# Patient Record
Sex: MALE | Race: BLACK OR AFRICAN AMERICAN | Employment: FULL TIME | URBAN - METROPOLITAN AREA
[De-identification: names, ages, dates, MRNs, and addresses within clinical notes are randomized per-mention and may not be internally consistent; named-entity substitution may affect disease eponyms.]

---

## 2017-05-05 ENCOUNTER — OFFICE VISIT (OUTPATIENT)
Dept: INTERNAL MEDICINE CLINIC | Age: 72
End: 2017-05-05

## 2017-05-05 VITALS
DIASTOLIC BLOOD PRESSURE: 73 MMHG | WEIGHT: 165.1 LBS | HEART RATE: 62 BPM | BODY MASS INDEX: 23.11 KG/M2 | SYSTOLIC BLOOD PRESSURE: 131 MMHG | HEIGHT: 71 IN | TEMPERATURE: 98 F | RESPIRATION RATE: 16 BRPM | OXYGEN SATURATION: 100 %

## 2017-05-05 DIAGNOSIS — E78.5 HYPERLIPIDEMIA, UNSPECIFIED HYPERLIPIDEMIA TYPE: ICD-10-CM

## 2017-05-05 DIAGNOSIS — I10 ESSENTIAL HYPERTENSION: ICD-10-CM

## 2017-05-05 DIAGNOSIS — N18.30 CKD (CHRONIC KIDNEY DISEASE) STAGE 3, GFR 30-59 ML/MIN (HCC): ICD-10-CM

## 2017-05-05 DIAGNOSIS — E11.69 TYPE 2 DIABETES MELLITUS WITH OTHER SPECIFIED COMPLICATION (HCC): Primary | ICD-10-CM

## 2017-05-05 LAB
GLUCOSE POC: 114 MG/DL
HBA1C MFR BLD HPLC: 6.2 %
HGB BLD-MCNC: 6.2 G/DL

## 2017-05-05 RX ORDER — AMLODIPINE BESYLATE 5 MG/1
5 TABLET ORAL DAILY
Qty: 90 TAB | Refills: 3 | Status: SHIPPED | OUTPATIENT
Start: 2017-05-05 | End: 2017-09-08 | Stop reason: SDUPTHER

## 2017-05-05 RX ORDER — LISINOPRIL 20 MG/1
20 TABLET ORAL DAILY
Qty: 90 TAB | Refills: 3 | Status: SHIPPED | OUTPATIENT
Start: 2017-05-05

## 2017-05-05 RX ORDER — INSULIN ASPART 100 [IU]/ML
INJECTION, SOLUTION INTRAVENOUS; SUBCUTANEOUS
Qty: 3 PEN | Refills: 3 | Status: SHIPPED | OUTPATIENT
Start: 2017-05-05 | End: 2017-11-13 | Stop reason: SDUPTHER

## 2017-05-05 NOTE — PROGRESS NOTES
Octavio Jung is a 66 y.o. male and presents with Diabetes and Follow-up  . Compliant with meds and DM diet. No CP, SOB, or edema. Checks BS every day (100-135). No paresthesias. Last DWAYNE was 2015. Normal exam.   Nocturia x 1. Was seeing renal but his appt last month had to be rescheduled. He saw Dr. Lilibeth Pereira (Vasc surg) who examined his carotids who gave him a clean bill of health with f/u in 1 yr. Review of Systems  Constitutional: negative for fevers, chills, anorexia and weight loss  Eyes:   negative for visual disturbance and irritation  ENT:   negative for tinnitus,sore throat,nasal congestion,ear pains. hoarseness  Respiratory:  negative for cough, hemoptysis, dyspnea,wheezing  CV:   negative for chest pain, palpitations, lower extremity edema  GI:   negative for nausea, vomiting, diarrhea, abdominal pain,melena  Endo:               negative for polyuria,polydipsia,polyphagia,heat intolerance  Genitourinary: negative for frequency, dysuria and hematuria  Integument:  negative for rash and pruritus  Hematologic:  negative for easy bruising and gum/nose bleeding  Musculoskel: negative for myalgias, arthralgias, back pain, muscle weakness, joint pain  Neurological:  negative for headaches, dizziness, vertigo, memory problems and gait   Behavl/Psych: negative for feelings of anxiety, depression, mood changes    Past Medical History:   Diagnosis Date    Diabetes ([facility]     Hyperlipidemia     Hypertension      History reviewed. No pertinent surgical history.   Social History     Social History    Marital status: UNKNOWN     Spouse name: N/A    Number of children: N/A    Years of education: N/A     Social History Main Topics    Smoking status: Never Smoker    Smokeless tobacco: None    Alcohol use No    Drug use: No    Sexual activity: Not Asked     Other Topics Concern    None     Social History Narrative     Current Outpatient Prescriptions   Medication Sig Dispense Refill    amLODIPine (NORVASC) 5 mg tablet Take 1 Tab by mouth daily. 90 Tab 3    lisinopril (PRINIVIL, ZESTRIL) 20 mg tablet Take 1 Tab by mouth daily. 90 Tab 3    insulin detemir (LEVEMIR FLEXTOUCH) 100 unit/mL (3 mL) inpn 30 units once daily 6 Pen 3    insulin aspart (NOVOLOG) 100 unit/mL inpn 15 units bid with meals 3 Pen 3    atorvastatin (LIPITOR) 40 mg tablet TAKE ONE-HALF TABLET BY MOUTH ONCE DAILY 45 Tab 5    Insulin Needles, Disposable, (BD INSULIN PEN NEEDLE UF SHORT) 31 gauge x 5/16\" ndle Use with insulin pen. E11.65 1 Package 11    glucose blood VI test strips (BLOOD GLUCOSE TEST) strip Checks sugars once a day Dx\" E11.65 100 Strip 11     Allergies   Allergen Reactions    Cialis [Tadalafil] Other (comments)     headache    Milk Unable to Obtain    Shellfish Derived Swelling    Viagra [Sildenafil] Other (comments)     Headache       Objective:  Visit Vitals    /73 (BP 1 Location: Left arm, BP Patient Position: Sitting)    Pulse 62    Temp 98 °F (36.7 °C) (Oral)    Resp 16    Ht 5' 10.8\" (1.798 m)    Wt 165 lb 1.6 oz (74.9 kg)    SpO2 100%    BMI 23.16 kg/m2     Physical Exam:   General appearance - alert, well appearing, and in no distress  Mental status - alert, oriented to person, place, and time  Chest - clear to auscultation, no wheezes, rales or rhonchi, symmetric air entry   Heart - normal rate, regular rhythm, normal S1, S2, no murmurs, rubs, clicks or gallops   Abdomen - soft, nontender, nondistended, no masses or organomegaly  Lymph- no adenopathy palpable  Ext-peripheral pulses normal, no pedal edema, no clubbing or cyanosis  Skin-Warm and dry.  no hyperpigmentation, vitiligo, or suspicious lesions  Neuro -alert, oriented, normal speech, no focal findings or movement disorder noted      Results for orders placed or performed in visit on 05/05/17   AMB POC GLUCOSE BLOOD, BY GLUCOSE MONITORING DEVICE   Result Value Ref Range    Glucose  mg/dL   AMB POC HEMOGLOBIN (HGB)   Result Value Ref Range    Hemoglobin (POC) 6.2    AMB POC HEMOGLOBIN A1C   Result Value Ref Range    Hemoglobin A1c (POC) 6.2 %       Assessment/Plan:    ICD-10-CM ICD-9-CM    1. Type 2 diabetes mellitus with other specified complication (HCC) F44.86 250.80 AMB POC GLUCOSE BLOOD, BY GLUCOSE MONITORING DEVICE      AMB POC HEMOGLOBIN (HGB)      AMB POC HEMOGLOBIN A1C      REFERRAL TO OPHTHALMOLOGY      METABOLIC PANEL, COMPREHENSIVE   2. Essential hypertension I09 787.3 METABOLIC PANEL, COMPREHENSIVE   3. Hyperlipidemia, unspecified hyperlipidemia type P83.3 857.1 METABOLIC PANEL, COMPREHENSIVE      LIPID PANEL     Orders Placed This Encounter    METABOLIC PANEL, COMPREHENSIVE    LIPID PANEL    REFERRAL TO OPHTHALMOLOGY     Referral Priority:   Routine     Referral Type:   Consultation     Referral Reason:   Specialty Services Required     Referral Location:   Tejinder Roca M.D. Referred to Provider:   Tejinder Roca MD    AMB POC GLUCOSE BLOOD, BY GLUCOSE MONITORING DEVICE    AMB POC HEMOGLOBIN (HGB)    AMB POC HEMOGLOBIN A1C    amLODIPine (NORVASC) 5 mg tablet     Sig: Take 1 Tab by mouth daily. Dispense:  90 Tab     Refill:  3    lisinopril (PRINIVIL, ZESTRIL) 20 mg tablet     Sig: Take 1 Tab by mouth daily. Dispense:  90 Tab     Refill:  3    insulin detemir (LEVEMIR FLEXTOUCH) 100 unit/mL (3 mL) inpn     Si units once daily     Dispense:  6 Pen     Refill:  3    insulin aspart (NOVOLOG) 100 unit/mL inpn     Sig: 15 units bid with meals     Dispense:  3 Pen     Refill:  3     DM & HTN-Well controlled. CMP & lipid. DWAYNE ordered  CKD 3- rescheduling appt with renal      Follow-up Disposition:  Return in about 6 months (around 2017) for DM/HTN.

## 2017-05-05 NOTE — PROGRESS NOTES
1. Have you been to the ER, urgent care clinic since your last visit? Hospitalized since your last visit? No.    2. Have you seen or consulted any other health care providers outside of the [address] [place] [address] since your last visit? Include any pap smears or colon screening. No.  Blood glucose/A1C verbal order DR. Barrientos/MONIKA Vital.

## 2017-05-05 NOTE — MR AVS SNAPSHOT
Visit Information Date & Time Provider Department Dept. Phone Encounter #  
 5/5/2017  8:15 AM Moreno Toure, [address] [id number] Follow-up Instructions Return in about 6 months (around 11/5/2017) for DM/HTN. Upcoming Health Maintenance Date Due  
 EYE EXAM RETINAL OR DILATED Q1 7/11/1955 ZOSTER VACCINE AGE 60> 7/11/2004 GLAUCOMA SCREENING Q2Y 7/11/2009 Pneumococcal 65+ Low/Medium Risk (1 of 2 - PCV13) 7/11/2009 COLONOSCOPY 12/14/2015 FOOT EXAM Q1 2/19/2017 HEMOGLOBIN A1C Q6M 5/4/2017 MICROALBUMIN Q1 5/20/2017 MEDICARE YEARLY EXAM 5/21/2017 INFLUENZA AGE 9 TO ADULT 8/1/2017 LIPID PANEL Q1 11/4/2017 DTaP/Tdap/Td series (2 - Td) 5/20/2026 Allergies as of 5/5/2017  Review Complete On: 5/5/2017 By: Moreno Toure MD  
  
 Severity Noted Reaction Type Reactions Cialis [Tadalafil]  09/12/2014    Other (comments)  
 headache Milk  09/12/2014    Unable to Obtain Shellfish Derived  09/12/2014    Swelling Viagra [Sildenafil]  09/12/2014    Other (comments) Headache Current Immunizations  Reviewed on 4/1/2016 No immunizations on file. Not reviewed this visit You Were Diagnosed With   
  
 Codes Comments Type 2 diabetes mellitus with other specified complication (HCC)    -  Primary ICD-10-CM: E11.69 ICD-9-CM: 250.80 Essential hypertension     ICD-10-CM: I10 
ICD-9-CM: 401.9 Hyperlipidemia, unspecified hyperlipidemia type     ICD-10-CM: E78.5 ICD-9-CM: 272.4 Vitals BP Pulse Temp Resp Height(growth percentile) Weight(growth percentile) 131/73 (BP 1 Location: Left arm, BP Patient Position: Sitting) 62 98 °F (36.7 °C) (Oral) 16 5' 10.8\" (1.798 m) 165 lb 1.6 oz (74.9 kg) SpO2 BMI Smoking Status 100% 23.16 kg/m2 Never Smoker Vitals History BMI and BSA Data Body Mass Index Body Surface Area  
 23.16 kg/m 2 1.93 m 2 Preferred Pharmacy Pharmacy Name Phone [facility] [address] [place] [address] Dr Phillips, [address] [phone] Your Updated Medication List  
  
   
This list is accurate as of: 17  9:43 AM.  Always use your most recent med list. amLODIPine 5 mg tablet Commonly known as:  Patricia Contreras Take 1 Tab by mouth daily. atorvastatin 40 mg tablet Commonly known as:  LIPITOR  
TAKE ONE-HALF TABLET BY MOUTH ONCE DAILY  
  
 glucose blood VI test strips strip Commonly known as:  blood glucose test  
Checks sugars once a day Dx\" E11.65  
  
 insulin aspart 100 unit/mL Inpn Commonly known as:  [place] 15 units bid with meals  
  
 insulin detemir 100 unit/mL (3 mL) Inpn Commonly known as:  LEVEMIR FLEXTOUCH  
30 units once daily Insulin Needles (Disposable) 31 gauge x \" Ndle Commonly known as:  BD INSULIN PEN NEEDLE UF SHORT Use with insulin pen. E11.65  
  
 lisinopril 20 mg tablet Commonly known as:  Salvadore Dine Take 1 Tab by mouth daily. Prescriptions Sent to Pharmacy Refills  
 amLODIPine (NORVASC) 5 mg tablet 3 Sig: Take 1 Tab by mouth daily. Class: Normal  
 Pharmacy: [facility] [address] [place] [address] Dr Phillips, [address] Ph #: [phone] Route: Oral  
 lisinopril (PRINIVIL, ZESTRIL) 20 mg tablet 3 Sig: Take 1 Tab by mouth daily. Class: Normal  
 Pharmacy: [facility] [address] [place] [address] Dr Phillips, [address] Ph #: [phone] Route: Oral  
 insulin detemir (LEVEMIR FLEXTOUCH) 100 unit/mL (3 mL) inpn 3 Si units once daily Class: Normal  
 Pharmacy: [facility] [address] [place] [address] Dr Phillips, [address] Ph #: [phone] insulin aspart (NOVOLOG) 100 unit/mL inpn 3 Sig: 15 units bid with meals Class: Normal  
 Pharmacy: [facility] [address] [place] [address] Dr Phillips, [address] Ph #: [phone] We Performed the Following AMB POC GLUCOSE BLOOD, BY GLUCOSE MONITORING DEVICE [26123 CPT(R)] AMB POC HEMOGLOBIN (HGB) [86443 CPT(R)] AMB POC HEMOGLOBIN A1C [68055 CPT(R)] LIPID PANEL [53377 CPT(R)] METABOLIC PANEL, COMPREHENSIVE [00779 CPT(R)] REFERRAL TO OPHTHALMOLOGY [REF57 Custom] Follow-up Instructions Return in about 6 months (around 11/5/2017) for DM/HTN. Referral Information Referral ID Referred By Referred To  
  
 [id number] Vini RESTREPO M.D. [facility] [address] [place], [address] Visits Status Start Date End Date 1 New Request 5/5/17 5/5/18 If your referral has a status of pending review or denied, additional information will be sent to support the outcome of this decision. Introducing [facility]! Scar Alston introduces [employer] patient portal. Now you can access parts of your medical record, email your doctor's office, and request medication refills online. 1. In your internet browser, go to [email or web address] 2. Click on the First Time User? Click Here link in the Sign In box. You will see the New Member Sign Up page. 3. Enter your [employer] Access Code exactly as it appears below. You will not need to use this code after youve completed the sign-up process. If you do not sign up before the expiration date, you must request a new code. · [employer] Access Code: [email or web address] Expires: 7/15/2017  5:22 PM 
 
4. Enter the last four digits of your Social Security Number (xxxx) and Date of Birth (mm/dd/yyyy) as indicated and click Submit. You will be taken to the next sign-up page. 5. Create a [employer] ID. This will be your [employer] login ID and cannot be changed, so think of one that is secure and easy to remember. 6. Create a [employer] password. You can change your password at any time. 7. Enter your Password Reset Question and Answer.  This can be used at a later time if you forget your password. 8. Enter your e-mail address. You will receive e-mail notification when new information is available in [address] 9. Click Sign Up. You can now view and download portions of your medical record. 10. Click the Download Summary menu link to download a portable copy of your medical information. If you have questions, please visit the Frequently Asked Questions section of the [employer] website. Remember, [employer] is NOT to be used for urgent needs. For medical emergencies, dial 911. Now available from your iPhone and Android! Please provide this summary of care documentation to your next provider. Your primary care clinician is listed as Stephan Sandoval. If you have any questions after today's visit, please call [phone].

## 2017-09-08 RX ORDER — AMLODIPINE BESYLATE 5 MG/1
5 TABLET ORAL DAILY
Qty: 90 TAB | Refills: 3 | Status: SHIPPED | OUTPATIENT
Start: 2017-09-08

## 2017-11-14 RX ORDER — INSULIN ASPART 100 [IU]/ML
INJECTION, SOLUTION INTRAVENOUS; SUBCUTANEOUS
Qty: 3 PEN | Refills: 3 | Status: SHIPPED | OUTPATIENT
Start: 2017-11-14

## 2022-12-16 ENCOUNTER — TRANSCRIBE ORDER (OUTPATIENT)
Dept: SCHEDULING | Age: 77
End: 2022-12-16

## 2022-12-16 DIAGNOSIS — N18.9 CHRONIC RENAL DISEASE: Primary | ICD-10-CM

## 2022-12-22 ENCOUNTER — HOSPITAL ENCOUNTER (OUTPATIENT)
Dept: ULTRASOUND IMAGING | Age: 77
Discharge: HOME | End: 2022-12-22
Attending: INTERNAL MEDICINE
Payer: MEDICARE

## 2022-12-22 DIAGNOSIS — N18.9 CHRONIC RENAL DISEASE: ICD-10-CM

## 2022-12-22 PROCEDURE — 76770 US EXAM ABDO BACK WALL COMP: CPT

## 2023-01-23 ENCOUNTER — TRANSCRIBE ORDER (OUTPATIENT)
Dept: SCHEDULING | Age: 78
End: 2023-01-23

## 2023-01-23 DIAGNOSIS — N18.30 CHRONIC KIDNEY DISEASE, STAGE III (MODERATE) (HCC): Primary | ICD-10-CM

## 2024-08-01 PROBLEM — E61.1 IRON DEFICIENCY: Status: ACTIVE | Noted: 2024-08-01

## 2024-08-01 PROBLEM — D63.1 ANEMIA OF CHRONIC RENAL FAILURE: Status: ACTIVE | Noted: 2024-08-01

## 2024-08-01 PROBLEM — N18.9 ANEMIA OF CHRONIC RENAL FAILURE: Status: ACTIVE | Noted: 2024-08-01

## 2024-08-13 ENCOUNTER — HOSPITAL ENCOUNTER (OUTPATIENT)
Facility: HOSPITAL | Age: 79
Setting detail: INFUSION SERIES
End: 2024-08-13